# Patient Record
Sex: FEMALE | URBAN - METROPOLITAN AREA
[De-identification: names, ages, dates, MRNs, and addresses within clinical notes are randomized per-mention and may not be internally consistent; named-entity substitution may affect disease eponyms.]

---

## 2022-02-28 ENCOUNTER — HOSPITAL ENCOUNTER (OUTPATIENT)
Dept: NUTRITION | Age: 52
End: 2022-02-28

## 2022-03-01 ENCOUNTER — APPOINTMENT (OUTPATIENT)
Dept: NUTRITION | Age: 52
End: 2022-03-01

## 2023-01-16 ENCOUNTER — HOSPITAL ENCOUNTER (OUTPATIENT)
Dept: PHYSICAL THERAPY | Age: 53
Discharge: HOME | End: 2023-01-16
Payer: COMMERCIAL

## 2023-01-16 PROCEDURE — 97161 PT EVAL LOW COMPLEX 20 MIN: CPT

## 2023-01-16 NOTE — PROGRESS NOTES
[facility]  [address], [address] [facility] [place]  Ph [phone]  Fx [phone]    Plan of Care/ Statement of Necessity for Physical Therapy Services    Patient name: Yahir Stacy Start of Care: 2023   Referral source: [place] MD : 1970    Medical Diagnosis: Urge incontinence [N39.41]   Onset Date:2021    Treatment Diagnosis: Urge incontinence [N39.41]   Prior Hospitalization: see medical history Provider#: [id number]   Medications: Verified on Patient summary List    Comorbidities: pt reports having a history of LBP   Prior Level of Function: pt was incontinence during her pregnancies but was fine afterwards up until her onset date listed above          The Plan of Care and following information is based on the information from the initial evaluation. Assessment/ key information: Patient is a 47year old female presenting to Physical Therapy with c/o mixed urinary incontinence which is limiting ability function at previous level. Patient presents with decreased strength, coordination, and endurance of the pelvic floor muscles and decreased strength of postural stabilizers. Patient presents with fair understanding of bladder and bowel anatomy/function, dietary irritants, and urge suppression. I feel patient would benefit from skilled therapeutic intervention to optimize highest functional level possible. Patient will benefit from skilled PT services to modify and progress therapeutic interventions, address strength deficits, analyze and address soft tissue restrictions, analyze and cue movement patterns, analyze and modify body mechanics/ergonomics, assess and modify postural abnormalities, and instruct in home and community integration to attain remaining goals.     Evaluation Complexity History LOW Complexity : Zero comorbidities / personal factors that will impact the outcome / POC; Examination LOW Complexity : 1-2 Standardized tests and measures addressing body structure, function, activity limitation and / or participation in recreation  ;Presentation LOW Complexity : Stable, uncomplicated  ;Clinical Decision Making MEDIUM Complexity : FOTO score of 26-74 FOTO score = an established functional score where 100 = no disability  Overall Complexity Rating: LOW     Problem List: Decreased pelvic floor mm awareness, Decreased pelvic floor mm strength, Use of accessory muscles, Improper voiding habits, Urinary urgency, and Other  Treatment Plan may include any combination of the following: Therapeutic exercise, Urge suppression techniques, Neuromuscular re-education, Patient education, and Other  Patient / Family readiness to learn indicated by: asking questions, trying to perform skills, and interest  Persons(s) to be included in education: patient (P)  Barriers to Learning/Limitations: None  Patient Goal (s): pt wants to get strong and hold her pee and not rush to the bathroom, she doesn't want to leak when you cough and not a wear a pad  Patient Self Reported Health Status: excellent  Rehabilitation Potential: excellent    Short term goals: To be achieved in 6 sessions:  Patient will demonstrate proper dietary/fluid habits and urge suppression strategies that promote bladder and bowel health to aid in management of urinary urgency and incontinence. Status at eval: Patient consuming 64-80 oz of water and unaware of bladder fitness, dietary irritants and urge suppression strategies. Patient will report improvement in UI complaints evidence by a decrease in pad usage and/or amount of leakage by 25-50% to improve QOL. Status at eval: Patient using 3 pads (3) per day, generally soaked (amount of leakage). Patient will be able to maintain pelvic floor contraction for 10 seconds, 10 repetitions with no accessory substitution or breath holding. Status at eval: PFMC 7 seconds, 7 repetitions      Long term goals:  To be achieved in 12 sessions:  Patient will report bladder continence 50-75% of the time with cough/sneeze/laugh and walking to the toilet. Status at eval: patient stress and urgency incontinence 3 times  per day     Patient will demonstrate pelvic floor muscle contraction at least 4/5 and ability to maintain contraction for 10 seconds, 10 repetitions. Status at eval: PFM 3-/5, 7 second hold, 7 repetitions     Patient will demonstrate improvement of current complaints evidenced by a 14 point  improvement in FOTO, Pelvic functional disability index score  Status at Eval: Pelvic functional disability index 47     Patient will demonstrate independence with management tools and exercise program that are beneficial for current condition in order to feel comfortable with Pelvic floor PT D/C and not fear exacerbation of current condition or symptoms returning. Status at eval : patient fearful of return to exercise and unaware of what activities to avoid to avoid exacerbation of current condition       Frequency / Duration: Patient to be seen for 12 sessions    Patient/ Caregiver education and instruction: Diagnosis, prognosis, Proper Voiding Habits, Diet, Exercises, and Bladder Retraining   [x]  Plan of care has been reviewed with KIMBERLY Solis, PT 1/16/2023 4:05 PM    ________________________________________________________________________    I certify that the above Therapy Services are being furnished while the patient is under my care. I agree with the treatment plan and certify that this therapy is necessary.     Physician's Signature:____________________  Date:____________Time:____________                                      Manasa Bustamante MD      Please sign and return to [facility] [address] [place], [address] [facility]  Ph [phone]  Fx [phone]

## 2023-01-16 NOTE — PROGRESS NOTES
Physical Therapy Evaluation     Patient Name: Miguelina Toussaint  Date:2023  : 1970  [x]  Patient  Verified  Payor: [employer] / Plan: [place] [employer] / Product Type: HMO /    In time:11:30  Out time:12:30  Total Treatment Time (min): 60  Visit #: 1 of 12    Treatment Area: Urge incontinence [N39.41]    SUBJECTIVE    Current symptoms/Complaints: Pt is a 47year old female who presents to physical therapy with complaints of urinary leakage, that began about 2-3 years ago. Her periods have started changing recently began within the past year. Her incontinence started with her first child and worse with her second one. She started having hemmorhoids when she was pregnant as well. That improved over time. Currently, when she coughs, when she sits down onto the toilet she leaks. She feel like she has a strong urge and has a lot of urine but only a small amount comes out. When she coughs, she is soaked. When she really has to go, she feels like something is pushing and then the leaking becomes worse. 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   Exercise/Hobbies: elliptical and running/walking program, deadlift with weights when they were testing her with a  and she felt a lot of pushing down onto her bladder   Pt Goals: pt wants to get strong and hold her pee and not rush to the bathroom, she doesn't want to leak when you cough and not a wear a pad    Urinary Symptoms:     Current urinary complaint  leaks with activity (stress induced)  leaks with urgency   Coughing, laughing, walking to the bathroom, sitting on the toilet    Bladder complaint longevity:  1 - 3 years    Bladder symptom progression:  worsened    Frequency of UI: when you cough leaks/changed pad 3 times per times    Pad use:  incontinence pads: 3- 1 per day    Pad wetness when changed:  soaked  But when she really has to go will leak with little drops    Daytime urinary frequency:  Every 1 hour(s) during the day    Nocturia:  2x/ night when she wakes up she has to pee    Bowel Symptoms:     Bowel function:  Regular BM, 5-7 per week - sometimes has to strain, but not often; if she eats spicy foods it's worse  Stool Type [facility]): Type 1 - Separate Hard Lumps  Type 2 - Lumpy and Sausage Like  Type 3 - Sausage Shape with Cracks in the Surface  Type 4 - Like a Smooth Sausage or Snake  Type 5 - Soft Blobs with Clear Cut Edges  Type 6 - Mushy Consistency with Ragged Edges   Type 7 - Liquid Consistency with no Solid Pieces    Diet:    Fluid intake:  Fluid  Amount per day   Water 4 - 5 16 oz bottles of water   Caffeine [place] gray tea - 1 12 oz    Alcohol none   Other Occasional fresh squeezed juice     Weight: 167 lb - is currently doing intermittent fasting, lost 11 lbs so far    Physical Exam Objective Findings:  Pelvic Floor Assessment  Patient was educated on pelvic floor anatomy, structure, and function and implications for current presentation of s/s. Patient consents to pelvic floor assessment.      Observation:  Contraction - 3-/7/8/10  Bulge - good  Knack - mild reflex    PFM Screen:    Skin Integrity:  [x] Healthy [] Red  [] Labia Atrophy [] Fragile    Sensation: [x] Intact [] Diminished:    Muscle Bulk: [x] Symmetrical  [] Well-developed [] Atrophied:  []L   []R   []B    Prolapse: [x] Cystocele: Grade 1     External trigger point/ muscle tenderness: none    Pelvic floor manual exam: Performed via internal vaginal assessment    Normal     Pelvic floor MMT    PERF (Performance/Endurance/Repetitions//Flicks): 2-/1/9/81    Pelvic muscle release pattern  4 - complete release    40 min [x]Eval                  []Re-Eval       10 min Self Care: Review and handout provided on the following: PFM anatomy, structure and function as it pertains to current s/s, complaints and condition. Reviewed expectations for PFPT and POC. Bladder fitness education, urge suppression techniques, bladder irritants   Rationale:  Increase awareness and understanding of current condition to improve patients ability to independently and effectively attain goals and progress towards long term management of current condition. 10 min Neuromuscular Re-education: pelvic floor muscle contractions 7\"/7\" x 7 []  See flow sheet :   Rationale: increase strength, improve coordination, and increase proprioception  to improve the patients ability to activate pelvic floor    With   [] TE   [] TA   [] neuro   [] Self care: Patient Education: [] Initiate HEP  [] positioning   [] body mechanics   [] transfers      [] other:      Pain Level (0-10 scale) post treatment: 0    ASSESSMENT/Changes in Function: Patient is a 47year old female presenting to Physical Therapy with c/o mixed urinary incontinence which is limiting ability function at previous level. Patient presents with decreased strength, coordination, and endurance of the pelvic floor muscles and decreased strength of postural stabilizers. Patient presents with fair understanding of bladder and bowel anatomy/function, dietary irritants, and urge suppression. I feel patient would benefit from skilled therapeutic intervention to optimize highest functional level possible.      Patient will continue to benefit from skilled PT services to modify and progress therapeutic interventions, address strength deficits, analyze and address soft tissue restrictions, analyze and cue movement patterns, analyze and modify body mechanics/ergonomics, assess and modify postural abnormalities, and instruct in home and community integration to attain remaining goals. [x]  See Plan of Care  []  See progress note/recertification  []  See Discharge Summary         Progress towards goals / Updated goals:  Short term goals: To be achieved in 6 sessions:  Patient will demonstrate proper dietary/fluid habits and urge suppression strategies that promote bladder and bowel health to aid in management of urinary urgency and incontinence. Status at eval: Patient consuming 64-80 oz of water and unaware of bladder fitness, dietary irritants and urge suppression strategies. Patient will report improvement in UI complaints evidence by a decrease in pad usage and/or amount of leakage by 25-50% to improve QOL. Status at eval: Patient using 3 pads (3) per day, generally soaked (amount of leakage). Patient will be able to maintain pelvic floor contraction for 10 seconds, 10 repetitions with no accessory substitution or breath holding. Status at eval: PFMC 7 seconds, 7 repetitions     Long term goals: To be achieved in 12 sessions:  Patient will report bladder continence 50-75% of the time with cough/sneeze/laugh and walking to the toilet. Status at eval: patient stress and urgency incontinence 3 times  per day    Patient will demonstrate pelvic floor muscle contraction at least 4/5 and ability to maintain contraction for 10 seconds, 10 repetitions.    Status at eval: PFM 3-/5, 7 second hold, 7 repetitions    Patient will demonstrate improvement of current complaints evidenced by a 14 point  improvement in FOTO, Pelvic functional disability index score  Status at Eval: Pelvic functional disability index 47    Patient will demonstrate independence with management tools and exercise program that are beneficial for current condition in order to feel comfortable with Pelvic floor PT D/C and not fear exacerbation of current condition or symptoms returning.    Status at eval : patient fearful of return to exercise and unaware of what activities to avoid to avoid exacerbation of current condition    PLAN  []  Upgrade activities as tolerated     [x]  Continue plan of care  []  Update interventions per flow sheet       []  Discharge due to:_  []  Other:_      Radha Hopper, PT 1/16/2023  11:37 AM

## 2023-01-30 ENCOUNTER — HOSPITAL ENCOUNTER (OUTPATIENT)
Dept: PHYSICAL THERAPY | Age: 53
Discharge: HOME | End: 2023-01-30
Payer: COMMERCIAL

## 2023-01-30 PROCEDURE — 97112 NEUROMUSCULAR REEDUCATION: CPT

## 2023-01-30 PROCEDURE — 97110 THERAPEUTIC EXERCISES: CPT

## 2023-01-30 PROCEDURE — 97535 SELF CARE MNGMENT TRAINING: CPT

## 2023-01-30 NOTE — PROGRESS NOTES
PF DAILY TREATMENT NOTE    Patient Name: Hannah Jean  [facility]:  : 1970  [x]  Patient  Verified  Payor: Rafi Tucker / Plan: [employer] / Product Type: HMO /    In time:1130  Out time:1223  Total Treatment Time (min): 53    For MC/BCBS only  Total Timed Codes (min): 53  Of Timed Code minutes, 1:1 Treatment Time: 48     Visit #: 2 of 12    Treatment Area: Urge incontinence [N39.41]    SUBJECTIVE  Pain Level (0-10 scale): 0/10  Any medication changes, allergies to medications, adverse drug reactions, diagnosis change, or new procedure performed?: [x] No    [] Yes (see summary sheet for update)  Subjective functional status/changes:   [x] No changes reported    Patient reports doing the pelvic floor contractions but isn't certain that she is doing them correctly    OBJECTIVE      20 min Therapeutic Exercise:  [] See flow sheet :   [x]  Pelvic floor strengthening                 []  Pelvic floor downtraining  [x]  Quality pelvic floor contractions       []  Relaxation techniques  []  Urge suppression exercises  []  Other:  Rationale: increase strength and improve coordination  to improve the patients ability to maintain continence             25 min Neuromuscular Re-education:  []  See flow sheet :   []  Pelvic floor strengthening                 []  Pelvic floor downtraining  []  Quality pelvic floor contractions       []  Relaxation techniques  []  Urge suppression exercises  [x]  Other: Surface EMG  Rationale:Biofeedback was performed to determine the pelvic floor resting \"tone\", motor unit recruitment, endurance, and ability to relax from a work state to guide the treatments for the patient       8 min Self Care: HEP and KPFE installation and explanation   Rationale:    increase strength and improve coordination to improve the patients ability to maintain continence         With   [] TE   [] TA   [] neuro  [] manual  [] self care   [] other: Patient Education: [x] Review HEP    [] Progressed/Changed HEP based on:   [] positioning   [] body mechanics   [] transfers   [] heat/ice application    [] other:      Other Objective/Functional Measures:   Biofeedback expectations and implications were reviewed with patient prior to intervention,   Performed sEMG with perianal electrodes as follows:    Position, initial resting tone Work/Rest/Reps Comments   Sitting   5/10/10     Ave Resting at 5.9   uV    Min resting at 2.3    uV    Ave Work at   26.2     uV    [facility] Work at [facility] interval 20.2 uV   Sitting   2/4/10   Ave Resting at 10.8   uV    Min resting at   2.6  [employer] Work at   20.3     [employer] Work at [employer] interval 9.49uV    Standing  5/10/5 Ave Resting at 6.7   uV    Min resting at   3.9  [employer] Work at   23.7     [employer] Work at [facility] interval 17.07uV    Baseline prior to exercise in supine: Ave resting=    2.3 uV;  Min resting= 1.3  uV; Baseline prior to exercise in sitting : Ave resting=  3.7  uV;  Min resting= 3.0  uV;     Pain Level (0-10 scale) post treatment: 0/10    ASSESSMENT/Changes in Function: Patient tolerated today's session well with no c/o pain. Surface EMG study was performed. Patient demonstrated good motor unit recruitment, decreased endurance, good work to rest ratio but with decreased coordination of work to rest.  Patient demonstrated understanding of today's instruction, education, and recommendations. Patient is progressing appropriately towards goals. Patient will continue to benefit from skilled PT services to modify and progress therapeutic interventions, address functional mobility deficits, address ROM deficits, address strength deficits, analyze and address soft tissue restrictions, analyze and cue movement patterns, and analyze and modify body mechanics/ergonomics to attain remaining goals.      [x]  See Plan of Care  []  See progress note/recertification  []  See Discharge Summary         Progress towards goals / Updated goals:    Short term goals: To be achieved in 6 sessions:  Patient will demonstrate proper dietary/fluid habits and urge suppression strategies that promote bladder and bowel health to aid in management of urinary urgency and incontinence. Status at eval: Patient consuming 64-80 oz of water and unaware of bladder fitness, dietary irritants and urge suppression strategies. Patient will report improvement in UI complaints evidence by a decrease in pad usage and/or amount of leakage by 25-50% to improve QOL. Status at eval: Patient using 3 pads (3) per day, generally soaked (amount of leakage). Patient will be able to maintain pelvic floor contraction for 10 seconds, 10 repetitions with no accessory substitution or breath holding. Status at eval: PFMC 7 seconds, 7 repetitions      Long term goals: To be achieved in 12 sessions:  Patient will report bladder continence 50-75% of the time with cough/sneeze/laugh and walking to the toilet. Status at eval: patient stress and urgency incontinence 3 times  per day     Patient will demonstrate pelvic floor muscle contraction at least 4/5 and ability to maintain contraction for 10 seconds, 10 repetitions. Status at eval: PFM 3-/5, 7 second hold, 7 repetitions     Patient will demonstrate improvement of current complaints evidenced by a 14 point  improvement in FOTO, Pelvic functional disability index score  Status at Eval: Pelvic functional disability index 47     Patient will demonstrate independence with management tools and exercise program that are beneficial for current condition in order to feel comfortable with Pelvic floor PT D/C and not fear exacerbation of current condition or symptoms returning.    Status at eval : patient fearful of return to exercise and unaware of what activities to avoid to avoid exacerbation of current condition  Current:  HEP was issued  and [employer] leslie was installed 1/30/2023    PLAN  [x]  Upgrade activities as tolerated     [x]  Continue plan of care  []  Update interventions per flow sheet       []  Discharge due to:_  []  Other:_      Parvez Ladd, PT 1/30/2023  11:48 AM    Future Appointments   Date Time Provider [place]   2/6/2023 10:00 AM Shirley Francis, PT [facility]   2/13/2023 10:00 AM Shirley Francis, [facility]   2/20/2023 10:00 AM Shirley Francis, PT [facility]   2/27/2023 10:00 AM Shirley Francis, PT [facility]

## 2023-02-06 ENCOUNTER — HOSPITAL ENCOUNTER (OUTPATIENT)
Dept: PHYSICAL THERAPY | Age: 53
Discharge: HOME | End: 2023-02-06
Payer: COMMERCIAL

## 2023-02-06 PROCEDURE — 97530 THERAPEUTIC ACTIVITIES: CPT

## 2023-02-06 PROCEDURE — 97112 NEUROMUSCULAR REEDUCATION: CPT

## 2023-02-06 PROCEDURE — 97110 THERAPEUTIC EXERCISES: CPT

## 2023-02-06 NOTE — PROGRESS NOTES
PT DAILY TREATMENT NOTE    Patient Name: Bharat Sites  [id number]  : 1970  [x]  Patient  Verified  Payor: Josseline Angle / Plan: [place] [employer] / Product Type: HMO /    In time:10:00  Out time:10:53  Total Treatment Time (min): 53  Total Timed Codes (min): 48  Visit #: 3 of 12    Treatment Dx: Urge incontinence [N39.41]    SUBJECTIVE  Pain Level (0-10 scale): 0  Any medication changes, allergies to medications, adverse drug reactions, diagnosis change, or new procedure performed?: [x] No    [] Yes (see summary sheet for update)  Subjective functional status/changes:   [] No changes reported  Pt reports that she is having a difficult time delaying her urge to void. She has tried the urge suppression techniques. She still goes just in case. OBJECTIVE    10 min Therapeutic Exercise:  [x] See flow sheet :   Rationale: increase ROM to improve the patients ability to restore PLOF     10 min Therapeutic Activity: pt educated again on bladder fitness []  See flow sheet :   Rationale: to decrease urinary urgency/frequency     33 min Neuromuscular Re-education:  [x]  See flow sheet :   Rationale: increase strength, improve coordination, improve balance, and increase proprioception  to improve the patients ability to activate pelvic floor and core          With   [] TE   [x] TA   [x] neuro   [] other: Patient Education: [x] Review HEP    [] Progressed/Changed HEP based on:   [] positioning   [] body mechanics   [] transfers   [] heat/ice application    [] other:      Other Objective/Functional Measures: more pelvic movement/less core stability with left lower extremity flexion as seen during running man     Pain Level (0-10 scale) post treatment: 0    ASSESSMENT/Changes in Function: Patient tolerated treatment session well today. Patient had no complaints with addition of dying bug and hip stretches to exercise program to accomplish more core support and improved hip flexibility for decreased urgency.   Patient continues to make good progress toward goals and would benefit from continued skilled PT intervention to address remaining deficits outlined in goals below. Patient will continue to benefit from skilled PT services to modify and progress therapeutic interventions, address functional mobility deficits, address ROM deficits, address strength deficits, analyze and cue movement patterns, analyze and modify body mechanics/ergonomics, assess and modify postural abnormalities, and instruct in home and community integration to attain remaining goals. [x]  See Plan of Care  []  See progress note/recertification  []  See Discharge Summary         Progress towards goals / Updated goals:  Short term goals: To be achieved in 6 sessions:  Patient will demonstrate proper dietary/fluid habits and urge suppression strategies that promote bladder and bowel health to aid in management of urinary urgency and incontinence. Status at eval: Patient consuming 64-80 oz of water and unaware of bladder fitness, dietary irritants and urge suppression strategies. Current: pt has tried using urge suppression techniques but reports it's very difficult 2/6/2023 progressing     Patient will report improvement in UI complaints evidence by a decrease in pad usage and/or amount of leakage by 25-50% to improve QOL. Status at eval: Patient using 3 pads (3) per day, generally soaked (amount of leakage). Patient will be able to maintain pelvic floor contraction for 10 seconds, 10 repetitions with no accessory substitution or breath holding. Status at eval: PFMC 7 seconds, 7 repetitions      Long term goals: To be achieved in 12 sessions:  Patient will report bladder continence 50-75% of the time with cough/sneeze/laugh and walking to the toilet.    Status at eval: patient stress and urgency incontinence 3 times  per day     Patient will demonstrate pelvic floor muscle contraction at least 4/5 and ability to maintain contraction for 10 seconds, 10 repetitions. Status at eval: PFM 3-/5, 7 second hold, 7 repetitions     Patient will demonstrate improvement of current complaints evidenced by a 14 point  improvement in FOTO, Pelvic functional disability index score  Status at Eval: Pelvic functional disability index 47     Patient will demonstrate independence with management tools and exercise program that are beneficial for current condition in order to feel comfortable with Pelvic floor PT D/C and not fear exacerbation of current condition or symptoms returning.    Status at eval : patient fearful of return to exercise and unaware of what activities to avoid to avoid exacerbation of current condition  Current:  HEP was issued  and [employer] leslie was installed 1/30/2023    PLAN  []  Upgrade activities as tolerated     [x]  Continue plan of care  []  Update interventions per flow sheet       []  Discharge due to:_  []  Other:_      Rancho Pruett PT 2/6/2023  9:54 AM    Future Appointments   Date Time Provider Day [place]   2/6/2023 10:00 AM Gabino Snellen, PT [facility]   2/13/2023 10:00 AM Gabino Snellen, [address] [facility]   2/20/2023 10:00 AM Gabino Snellen, [address] [facility]   2/27/2023 10:00 AM Gabino Snellen, PT [facility]

## 2023-02-13 ENCOUNTER — HOSPITAL ENCOUNTER (OUTPATIENT)
Facility: HOSPITAL | Age: 53
Setting detail: RECURRING SERIES
Discharge: HOME | End: 2023-02-16
Payer: COMMERCIAL

## 2023-02-13 ENCOUNTER — APPOINTMENT (OUTPATIENT)
Facility: HOSPITAL | Age: 53
End: 2023-02-13
Payer: COMMERCIAL

## 2023-02-13 ENCOUNTER — TELEPHONE (OUTPATIENT)
Facility: HOSPITAL | Age: 53
End: 2023-02-13

## 2023-02-13 ENCOUNTER — APPOINTMENT (OUTPATIENT)
Dept: PHYSICAL THERAPY | Age: 53
End: 2023-02-13
Payer: COMMERCIAL

## 2023-02-13 PROCEDURE — 97112 NEUROMUSCULAR REEDUCATION: CPT

## 2023-02-13 PROCEDURE — 97530 THERAPEUTIC ACTIVITIES: CPT

## 2023-02-13 NOTE — PROGRESS NOTES
PHYSICAL / OCCUPATIONAL THERAPY - DAILY TREATMENT NOTE (updated )    Patient Name: Janki Merida    Date: 2023    : 1970  Insurance: Payor: [employer] / Plan: OPTIMA HMO / Product Type: *No Product type* /      Patient  verified yes     Visit #   Current / Total 4 12   Time   In / Out 10:05 11:03   Pain   In / Out 0 0   Subjective Functional Status/Changes: Pt reports she's been trying to hold back her urge but it's difficult. She also has had a difficult time remembering to fill out her bladder diary. She will keep working on it this week. Changes to:  Meds, Allergies, Med Hx, Sx Hx? If yes, update Summary List no       TREATMENT AREA =  Urge incontinence [N39.41]    OBJECTIVE  Therapeutic Procedures: Tx Min Billable or 1:1 Min (if diff from Tx Min) Procedure, Rationale, Specifics   [phone] [id number] Therapeutic Activity (timed):  use of dynamic activities replicating functional movements to increase ROM, strength, coordination, balance, and proprioception in order to improve patient's ability to progress to PLOF and address remaining functional goals. (see flow sheet as applicable)     Details if applicable:  testing for note of progress     33 [phone] [id number] Neuromuscular Re-Education (timed):  improve balance, coordination, kinesthetic sense, posture, core stability and proprioception to improve patient's ability to develop conscious control of individual muscles and awareness of position of extremities in order to progress to PLOF and address remaining functional goals.  (see flow sheet as applicable)     Details if applicable:            Details if applicable:            Details if applicable:            Details if applicable:     62 [phone] Totals Reminder: bill using total billable min of TIMED therapeutic procedures (example: do not include dry needle or estim unattended, both untimed codes, in totals to left)  8-22 min = 1 unit; 23-37 min = 2 units; 38-52 min = 3 units; 53-67 min = 4 units; 68-82 min = 5 units   Total Total     [x]  Patient Education billed concurrently with other procedures   [x] Review HEP    [] Progressed/Changed HEP, detail:    [] Other detail:       Objective Information/Functional Measures/Assessment    sEMG Biofeedback Results (in uV):    Supine:  10s on/10s off   work average:  15.4 rest average: 5  Seated:  2s on/4s off  work average:  19.9 rest average: 2.7  10s on/10s off  work average:  19.8 rest average: 4.4    Patient will continue to benefit from skilled PT / OT services to modify and progress therapeutic interventions, analyze and address strength deficits, analyze and cue for proper movement patterns, analyze and modify for postural abnormalities, and instruct in home and community integration to address functional deficits and attain remaining goals. Progress toward goals / Updated goals:  [x]  See Progress Note/Recertification    Short term goals: To be achieved in 6 sessions:  Patient will demonstrate proper dietary/fluid habits and urge suppression strategies that promote bladder and bowel health to aid in management of urinary urgency and incontinence. Status at eval: Patient consuming 64-80 oz of water and unaware of bladder fitness, dietary irritants and urge suppression strategies. Current: pt has tried using urge suppression techniques but reports it's very difficult 2/6/2023 progressing     Patient will report improvement in UI complaints evidence by a decrease in pad usage and/or amount of leakage by 25-50% to improve QOL. Status at eval: Patient using 3 pads (3) per day, generally soaked (amount of leakage). Current: pt is using 2 pads per day, sometimes they are soaked, last week only 1 time was soaked 2/13/2023 progressing     Patient will be able to maintain pelvic floor contraction for 10 seconds, 10 repetitions with no accessory substitution or breath holding.   Status at eval: PFMC 7 seconds, 7 repetitions   Current: pt deferred internal assessment but biofeedback revealed that patient was able to sustain a strong contraction for 10 seconds, 10 times 2/13/2023 goal met     Long term goals: To be achieved in 12 sessions:  Patient will report bladder continence 50-75% of the time with cough/sneeze/laugh and walking to the toilet. Status at eval: patient stress and urgency incontinence 3 times  per day  Current: notices leaking less 2/13/2023 progressing     Patient will demonstrate pelvic floor muscle contraction at least 4/5 and ability to maintain contraction for 10 seconds, 10 repetitions. Status at eval: PFM 3-/5, 7 second hold, 7 repetitions  Current: pt deferred internal assessment but biofeedback revealed that patient was able to sustain a contraction at a net strength of 15 uV for 10 seconds, 10 times 2/13/2023 progressing     Patient will demonstrate improvement of current complaints evidenced by a 14 point  improvement in FOTO, Pelvic functional disability index score  Status at Eval: Pelvic functional disability index 47  Current: 49 2/13/2023 progressing     Patient will demonstrate independence with management tools and exercise program that are beneficial for current condition in order to feel comfortable with Pelvic floor PT D/C and not fear exacerbation of current condition or symptoms returning.    Status at eval : patient fearful of return to exercise and unaware of what activities to avoid to avoid exacerbation of current condition  Current:  HEP was issued  and [employer] aliza was installed  2/13/2023 progressing    PLAN  yes Continue plan of care  [x]  Upgrade activities as tolerated  []  Discharge due to :  []  Other:    Bakari Aguilar PT    2/13/2023    10:05 AM    Future Appointments   Date Time Provider [place]   2/20/2023 10:00 AM Bakari Aguilar PT [facility]   2/27/2023 10:00 AM Bakari Aguilar PT [facility]

## 2023-02-13 NOTE — PROGRESS NOTES
[facility]  [address] [place], [address]  Ph [phone]  Fx [phone]    Physical Therapy Progress Note  Patient name: Genia Schilder Start of Care: 2023   Referral source: Jerry Du MD : 1970   Medical/Treatment Diagnosis: Urge incontinence [N39.41] Onset Date:2021   Prior Hospitalization: see medical history Provider#: [id number]   Medications: Verified on Patient Summary List    Comorbidities: none  Prior Level of Function:patient was not incontinent    Visits from Start of Care: 4   Missed Visits: 1    Updated Goals/Measure of Progress:     Short term goals: To be achieved in 2 sessions:  Patient will demonstrate proper dietary/fluid habits and urge suppression strategies that promote bladder and bowel health to aid in management of urinary urgency and incontinence. Status at eval: Patient consuming 64-80 oz of water and unaware of bladder fitness, dietary irritants and urge suppression strategies. Current: pt has tried using urge suppression techniques but reports it's very difficult 2023 progressing     Patient will report improvement in UI complaints evidence by a decrease in pad usage and/or amount of leakage by 25-50% to improve QOL. Status at eval: Patient using 3 pads (3) per day, generally soaked (amount of leakage). Current: pt is using 2 pads per day, sometimes they are soaked, last week only 1 time was soaked 2023 progressing     Patient will be able to maintain pelvic floor contraction for 10 seconds, 10 repetitions with no accessory substitution or breath holding. Status at eval: PFMC 7 seconds, 7 repetitions   Current: pt deferred internal assessment but biofeedback revealed that patient was able to sustain a strong contraction for 10 seconds, 10 times 2023 goal met     Long term goals:  To be achieved in 8 sessions:  Patient will report bladder continence 50-75% of the time with cough/sneeze/laugh and walking to the toilet. Status at eval: patient stress and urgency incontinence 3 times  per day  Current: notices leaking less 2/13/2023 progressing     Patient will demonstrate pelvic floor muscle contraction at least 4/5 and ability to maintain contraction for 10 seconds, 10 repetitions. Status at eval: PFM 3-/5, 7 second hold, 7 repetitions  Current: pt deferred internal assessment but biofeedback revealed that patient was able to sustain a contraction at a net strength of 15 uV for 10 seconds, 10 times 2/13/2023 progressing     Patient will demonstrate improvement of current complaints evidenced by a 14 point  improvement in FOTO, Pelvic functional disability index score  Status at Eval: Pelvic functional disability index 47  Current: 49 2/13/2023 progressing     Patient will demonstrate independence with management tools and exercise program that are beneficial for current condition in order to feel comfortable with Pelvic floor PT D/C and not fear exacerbation of current condition or symptoms returning. Status at eval : patient fearful of return to exercise and unaware of what activities to avoid to avoid exacerbation of current condition  Current:  HEP was issued  and [employer] aliza was installed  2/13/2023 progressing    Summary of Care/ Key Functional Changes: pt shows improved pelvic floor strength, but needs to work on coordination as she has a higher than normal resting tone. She also needs to work on her core strength in order to provide a good lift of the bladder. She has been incorporating good urge suppression strategies as well as trying to follow proper bladder fitness.       ASSESSMENT/RECOMMENDATIONS:  [x]Continue therapy per initial plan/protocol at a frequency of  8 sessions  []Continue therapy with the following recommended changes:_____________________ _____________________________ ________________________________________  []Discontinue therapy progressing towards or have reached established goals  []Discontinue therapy due to lack of appreciable progress towards goals  []Discontinue therapy due to lack of attendance or compliance  []Await Physician's recommendations/decisions regarding therapy  []Other:________________________________________________________________    Thank you for this referral.   Abdullahi Arnett, PT 2/13/2023 11:52 AM

## 2023-02-20 ENCOUNTER — APPOINTMENT (OUTPATIENT)
Facility: HOSPITAL | Age: 53
End: 2023-02-20
Payer: COMMERCIAL

## 2023-02-20 ENCOUNTER — APPOINTMENT (OUTPATIENT)
Dept: PHYSICAL THERAPY | Age: 53
End: 2023-02-20
Payer: COMMERCIAL

## 2023-02-27 ENCOUNTER — APPOINTMENT (OUTPATIENT)
Dept: PHYSICAL THERAPY | Age: 53
End: 2023-02-27
Payer: COMMERCIAL

## 2023-02-27 ENCOUNTER — HOSPITAL ENCOUNTER (OUTPATIENT)
Facility: HOSPITAL | Age: 53
Setting detail: RECURRING SERIES
Discharge: HOME | End: 2023-03-02
Payer: COMMERCIAL

## 2023-02-27 PROCEDURE — 97112 NEUROMUSCULAR REEDUCATION: CPT

## 2023-02-27 PROCEDURE — 97530 THERAPEUTIC ACTIVITIES: CPT

## 2023-02-27 PROCEDURE — 97110 THERAPEUTIC EXERCISES: CPT

## 2023-02-27 NOTE — PROGRESS NOTES
PHYSICAL / OCCUPATIONAL THERAPY - DAILY TREATMENT NOTE (updated )    Patient Name: Chen Alonzo    Date: 2023    : 1970  Insurance: Payor: [employer] Smith / Plan: OPTIMA HMO / Product Type: *No Product type* /      Patient  verified Yes     Visit #   Current / Total 5 12   Time   In / Out 10:00 10:55   Pain   In / Out 0 0   Subjective Functional Status/Changes: Pt tries to drink 4-5 16 oz bottles of water/day. She reports sometimes waiting and sometimes it doesn't work. She leaked a little bit this week while bending and coughing while standing. Changes to:  Meds, Allergies, Med Hx, Sx Hx? If yes, update Summary List no       TREATMENT AREA =  Urge incontinence [N39.41]    OBJECTIVE         Therapeutic Procedures: Tx Min Billable or 1:1 Min (if diff from Tx Min) Procedure, Rationale, Specifics   10 10 [id number] Therapeutic Exercise (timed):  increase ROM, strength, coordination, balance, and proprioception to improve patient's ability to progress to PLOF and address remaining functional goals. (see flow sheet as applicable)     Details if applicable:        [id number] Neuromuscular Re-Education (timed):  improve balance, coordination, kinesthetic sense, posture, core stability and proprioception to improve patient's ability to develop conscious control of individual muscles and awareness of position of extremities in order to progress to PLOF and address remaining functional goals. (see flow sheet as applicable)     Details if applicable:      79809 Therapeutic Activity (timed):  use of dynamic activities replicating functional movements to increase ROM, strength, coordination, balance, and proprioception in order to improve patient's ability to progress to PLOF and address remaining functional goals.   (see flow sheet as applicable)     Details if applicable:  pt educated on bending/lifting and sit to stand mechanics; pt reminded of \"the knack\" to squeeze before she sneezes          Details if applicable:            Details if applicable:     52 [address] [facility] Totals Reminder: bill using total billable min of TIMED therapeutic procedures (example: do not include dry needle or estim unattended, both untimed codes, in totals to left)  8-22 min = 1 unit; 23-37 min = 2 units; 38-52 min = 3 units; 53-67 min = 4 units; 68-82 min = 5 units   Total Total     [x]  Patient Education billed concurrently with other procedures   [x] Review HEP    [] Progressed/Changed HEP, detail:    [] Other detail:       Objective Information/Functional Measures/Assessment    Pt tolerated treatment well today. She was educated on proper bending/lifting and sit to stand mechanics as she has leaked in these positions. She has also had back pain after a day of bending/lifting. She was also challenged with standing core strengthening. Patient will continue to benefit from skilled PT / OT services to modify and progress therapeutic interventions, analyze and address functional mobility deficits, analyze and address ROM deficits, analyze and address strength deficits, analyze and address soft tissue restrictions, analyze and cue for proper movement patterns, analyze and modify for postural abnormalities, analyze and address imbalance/dizziness, and instruct in home and community integration to address functional deficits and attain remaining goals. Progress toward goals / Updated goals:  []  See Progress Note/Recertification    Short term goals: To be achieved in 6 sessions:  Patient will demonstrate proper dietary/fluid habits and urge suppression strategies that promote bladder and bowel health to aid in management of urinary urgency and incontinence. Status at eval: Patient consuming 64-80 oz of water and unaware of bladder fitness, dietary irritants and urge suppression strategies.    Current: pt has tried using urge suppression techniques but reports it's very difficult 2/6/2023 progressing     Patient will report improvement in UI complaints evidence by a decrease in pad usage and/or amount of leakage by 25-50% to improve QOL. Status at eval: Patient using 3 pads (3) per day, generally soaked (amount of leakage). Current: pt is using 2 pads per day, sometimes they are soaked, last week only 1 time was soaked 2/13/2023 progressing     Patient will be able to maintain pelvic floor contraction for 10 seconds, 10 repetitions with no accessory substitution or breath holding. Status at eval: PFMC 7 seconds, 7 repetitions   Current: pt deferred internal assessment but biofeedback revealed that patient was able to sustain a strong contraction for 10 seconds, 10 times 2/13/2023 goal met     Long term goals: To be achieved in 12 sessions:  Patient will report bladder continence 50-75% of the time with cough/sneeze/laugh and walking to the toilet. Status at eval: patient stress and urgency incontinence 3 times  per day  Current: notices leaking less 2/13/2023 progressing     Patient will demonstrate pelvic floor muscle contraction at least 4/5 and ability to maintain contraction for 10 seconds, 10 repetitions. Status at eval: PFM 3-/5, 7 second hold, 7 repetitions  Current: pt deferred internal assessment but biofeedback revealed that patient was able to sustain a contraction at a net strength of 15 uV for 10 seconds, 10 times 2/13/2023 progressing     Patient will demonstrate improvement of current complaints evidenced by a 14 point  improvement in FOTO, Pelvic functional disability index score  Status at Eval: Pelvic functional disability index 47  Current: 49 2/13/2023 progressing     Patient will demonstrate independence with management tools and exercise program that are beneficial for current condition in order to feel comfortable with Pelvic floor PT D/C and not fear exacerbation of current condition or symptoms returning.    Status at eval : patient fearful of return to exercise and unaware of what activities to avoid to avoid exacerbation of current condition  Current:  HEP was issued  and [employer] aliza was installed  2/13/2023 progressing       PLAN  Yes  Continue plan of care  []  Upgrade activities as tolerated  []  Discharge due to :  []  Other:    Miguel Ángel Estrella, PT    2/27/2023    10:00 AM    No future appointments.

## 2023-03-06 ENCOUNTER — HOSPITAL ENCOUNTER (OUTPATIENT)
Facility: HOSPITAL | Age: 53
Setting detail: RECURRING SERIES
End: 2023-03-06
Payer: COMMERCIAL

## 2023-03-06 ENCOUNTER — TELEPHONE (OUTPATIENT)
Facility: HOSPITAL | Age: 53
End: 2023-03-06

## 2023-03-06 NOTE — PROGRESS NOTES
PHYSICAL / OCCUPATIONAL THERAPY - DAILY TREATMENT NOTE (updated )    Patient Name: Audrey Laboy    Date: 3/6/2023    : 1970  Insurance: Payor: Tyler Ruggiero / Plan: OPTIMA HMO / Product Type: *No Product type* /      Patient  verified {YES/NO:80327}     Visit #   Current / Total *** ***   Time   In / Out *** ***   Pain   In / Out *** ***   Subjective Functional Status/Changes: ***   Changes to:  Meds, Allergies, Med Hx, Sx Hx? If yes, update Summary List {YES/NO DIET:[id number]}       TREATMENT AREA =  Urge incontinence [N39.41]    OBJECTIVE    Modalities Rationale:     {[facility] Modality Rationale:66882} to improve patient's ability to progress to PLOF and address remaining functional goals. min [] Estim Unattended, type/location:                                      []  w/ice    []  w/heat    min [] Estim Attended, type/location:                                     []  w/US     []  w/ice    []  w/heat    []  TENS insruct      min []  Mechanical Traction: type/lbs                   []  pro   []  sup   []  int   []  cont    []  before manual    []  after manual    min []  Ultrasound, settings/location:      min []  Iontophoresis w/ dexamethasone, location:                                               []  take home patch       []  in clinic        min  unbilled []  Ice     []  Heat    location/position:     min []  Paraffin,  details:     min []  Vasopneumatic Device, press/temp:     min []  Claudy Marcos / Iota Fuelling: If using vaso (only need to measure limb vaso being performed on)      pre-treatment girth :       post-treatment girth :       measured at (landmark location) :      min []  Other:    Skin assessment post-treatment (if applicable):    []  intact    []  redness- no adverse reaction                 []redness - adverse reaction:         Therapeutic Procedures:   Tx Min Billable or 1:1 Min (if diff from Tx Min) Procedure, Rationale, Specifics   ***  {[facility] Ther Procedures:47457}     Details if

## 2023-03-13 ENCOUNTER — HOSPITAL ENCOUNTER (OUTPATIENT)
Facility: HOSPITAL | Age: 53
Setting detail: RECURRING SERIES
Discharge: HOME | End: 2023-03-16
Payer: COMMERCIAL

## 2023-03-13 PROCEDURE — 97112 NEUROMUSCULAR REEDUCATION: CPT

## 2023-03-13 PROCEDURE — 97110 THERAPEUTIC EXERCISES: CPT

## 2023-03-13 PROCEDURE — 97530 THERAPEUTIC ACTIVITIES: CPT

## 2023-03-13 NOTE — PROGRESS NOTES
PHYSICAL / OCCUPATIONAL THERAPY - DAILY TREATMENT NOTE (updated )    Patient Name: Skyler Bolanos    Date: 3/13/2023    : 1970  Insurance: Payor: Jessica Marin / Plan: OPTIMA HMO / Product Type: *No Product type* /      Patient  verified Yes     Visit #   Current / Total 6 12   Time   In / Out 12:00 12:53   Pain   In / Out 0 0   Subjective Functional Status/Changes: Pt reports she is doing her pelvic floor exercises 2 sometimes 3 times per day    Changes to:  Meds, Allergies, Med Hx, Sx Hx? If yes, update Summary List no       TREATMENT AREA =  Urge incontinence [N39.41]    OBJECTIVE    Therapeutic Procedures: Tx Min Billable or 1:1 Min (if diff from Tx Min) Procedure, Rationale, Specifics   10  13818 Therapeutic Exercise (timed):  increase ROM, strength, coordination, balance, and proprioception to improve patient's ability to progress to PLOF and address remaining functional goals. (see flow sheet as applicable)     Details if applicable:       15  98853 Therapeutic Activity (timed):  use of dynamic activities replicating functional movements to increase ROM, strength, coordination, balance, and proprioception in order to improve patient's ability to progress to PLOF and address remaining functional goals. (see flow sheet as applicable)     Details if applicable:  sit to stand, step ups   28  10498 Neuromuscular Re-Education (timed):  improve balance, coordination, kinesthetic sense, posture, core stability and proprioception to improve patient's ability to develop conscious control of individual muscles and awareness of position of extremities in order to progress to PLOF and address remaining functional goals.  (see flow sheet as applicable)     Details if applicable:            Details if applicable:            Details if applicable:     48  [address] [employer] Totals Reminder: bill using total billable min of TIMED therapeutic procedures (example: do not include dry needle or estim unattended, both untimed codes, in totals to left)  8-22 min = 1 unit; 23-37 min = 2 units; 38-52 min = 3 units; 53-67 min = 4 units; 68-82 min = 5 units   Total Total     TOTAL TREATMENT TIME:        53     [x]  Patient Education billed concurrently with other procedures   [x] Review HEP    [] Progressed/Changed HEP, detail:    [] Other detail:       Objective Information/Functional Measures/Assessment    Pt tolerated treatment well today, progressed to standing pelvic floor strengthening.     Patient will continue to benefit from skilled PT / OT services to modify and progress therapeutic interventions, analyze and address functional mobility deficits, analyze and address ROM deficits, analyze and address strength deficits, analyze and address soft tissue restrictions, analyze and cue for proper movement patterns, analyze and modify for postural abnormalities, analyze and address imbalance/dizziness, and instruct in home and community integration to address functional deficits and attain remaining goals.    Progress toward goals / Updated goals:  []  See Progress Note/Recertification    Short term goals: To be achieved in 6 sessions:  Patient will demonstrate proper dietary/fluid habits and urge suppression strategies that promote bladder and bowel health to aid in management of urinary urgency and incontinence.  Status at eval: Patient consuming 64-80 oz of water and unaware of bladder fitness, dietary irritants and urge suppression strategies.   Current: pt has tried using urge suppression techniques but reports it's very difficult 2/6/2023 progressing     Patient will report improvement in UI complaints evidence by a decrease in pad usage and/or amount of leakage by 25-50% to improve QOL.  Status at eval: Patient using 3 pads (3) per day, generally soaked (amount of leakage).   Current: pt is using 2 pads per day, sometimes they are soaked, last week only 1 time was soaked 2/13/2023 progressing     Patient will be able to maintain pelvic floor  contraction for 10 seconds, 10 repetitions with no accessory substitution or breath holding. Status at eval: PFMC 7 seconds, 7 repetitions   Current: pt deferred internal assessment but biofeedback revealed that patient was able to sustain a strong contraction for 10 seconds, 10 times 2/13/2023 goal met     Long term goals: To be achieved in 12 sessions:  Patient will report bladder continence 50-75% of the time with cough/sneeze/laugh and walking to the toilet. Status at eval: patient stress and urgency incontinence 3 times  per day  Current: notices leaking less 2/13/2023 progressing     Patient will demonstrate pelvic floor muscle contraction at least 4/5 and ability to maintain contraction for 10 seconds, 10 repetitions. Status at eval: PFM 3-/5, 7 second hold, 7 repetitions  Current: pt deferred internal assessment but biofeedback revealed that patient was able to sustain a contraction at a net strength of 15 uV for 10 seconds, 10 times 2/13/2023 progressing     Patient will demonstrate improvement of current complaints evidenced by a 14 point  improvement in FOTO, Pelvic functional disability index score  Status at Eval: Pelvic functional disability index 47  Current: 49 2/13/2023 progressing     Patient will demonstrate independence with management tools and exercise program that are beneficial for current condition in order to feel comfortable with Pelvic floor PT D/C and not fear exacerbation of current condition or symptoms returning.    Status at eval : patient fearful of return to exercise and unaware of what activities to avoid to avoid exacerbation of current condition  Current:  HEP was issued  and Medius aliza was installed  2/13/2023 progressing    PLAN  Yes  Continue plan of care  []  Upgrade activities as tolerated  []  Discharge due to :  []  Other:    Manasa Najera, PT    3/13/2023    8:00 AM    Future Appointments   Date Time Provider Luana Lee   3/13/2023 12:00 PM Epi Cardona Tommy Grace [facility]   3/20/2023 12:00 PM Emil Beckett, PT [facility]   3/27/2023 10:30 AM Ingris Aguirre, PT [facility]

## 2023-03-20 ENCOUNTER — HOSPITAL ENCOUNTER (OUTPATIENT)
Facility: HOSPITAL | Age: 53
Setting detail: RECURRING SERIES
Discharge: HOME | End: 2023-03-23
Payer: COMMERCIAL

## 2023-03-20 PROCEDURE — 97530 THERAPEUTIC ACTIVITIES: CPT

## 2023-03-20 PROCEDURE — 97112 NEUROMUSCULAR REEDUCATION: CPT

## 2023-03-20 PROCEDURE — 97110 THERAPEUTIC EXERCISES: CPT

## 2023-03-20 NOTE — PROGRESS NOTES
[facility]  [address] [place], [address]  Ph [phone]  Fx [phone]    Physical Therapy Progress Note  Patient name: Rae Davidson Start of Care: 2023   Referral source: Oleg Bueno MD : 1970   Medical/Treatment Diagnosis: Urge incontinence [N39.41] Onset Date:2021   Prior Hospitalization: see medical history Provider#: [id number]   Medications: Verified on Patient Summary List    Comorbidities: none  Prior Level of Function:patient was not incontinent    Visits from Start of Care: 7    Missed Visits: 2    Updated Goals/Measure of Progress:     Short term goals:   Patient will demonstrate proper dietary/fluid habits and urge suppression strategies that promote bladder and bowel health to aid in management of urinary urgency and incontinence. Status at eval: Patient consuming 64-80 oz of water and unaware of bladder fitness, dietary irritants and urge suppression strategies. 2023: pt has tried using urge suppression techniques but reports it's very difficult   Current: pt has been trying to delay the urge to go to the bathroom. She tries to only go when she really has to, goes every hour, sometimes 2 hours 3/20/2023 progressing     Patient will report improvement in UI complaints evidence by a decrease in pad usage and/or amount of leakage by 25-50% to improve QOL. Status at eval: Patient using 3 pads (3) per day, generally soaked (amount of leakage). Last PN: pt is using 2 pads per day, sometimes they are soaked, last week only 1 time was soaked 2023 progressing  Current: pt sometimes uses 3 pads per day, has been experiencing seasonal allergies and has been coughing more frequently 3/20/2023  regression secondary to allergies     Patient will be able to maintain pelvic floor contraction for 10 seconds, 10 repetitions with no accessory substitution or breath holding.   Status at eval: PFMC 7 seconds, 7 repetitions   Current: pt deferred

## 2023-03-20 NOTE — PROGRESS NOTES
PHYSICAL / OCCUPATIONAL THERAPY - DAILY TREATMENT NOTE (updated )    Patient Name: Elijah Lawson    Date: 3/20/2023    : 1970  Insurance: Payor: Km Corrales / Plan: OPTIMA HMO / Product Type: *No Product type* /      Patient  verified Yes     Visit #   Current / Total 7 12   Time   In / Out 12 12:54   Pain   In / Out 0 0   Subjective Functional Status/Changes: Pt reports sometimes she's good with the exercises, sometimes not. Sometimes her bladder habits are good, sometimes not   Changes to:  Meds, Allergies, Med Hx, Sx Hx? If yes, update Summary List no       TREATMENT AREA =  Urge incontinence [N39.41]    OBJECTIVE    Therapeutic Procedures: Tx Min Billable or 1:1 Min (if diff from Tx Min) Procedure, Rationale, Specifics   25  23300 Therapeutic Activity (timed):  use of dynamic activities replicating functional movements to increase ROM, strength, coordination, balance, and proprioception in order to improve patient's ability to progress to PLOF and address remaining functional goals. (see flow sheet as applicable)     Details if applicable:  testing for note of progress and step ups with form education for functional strengthening     10  39533 Therapeutic Exercise (timed):  increase ROM, strength, coordination, balance, and proprioception to improve patient's ability to progress to PLOF and address remaining functional goals. (see flow sheet as applicable)     Details if applicable:      Neuromuscular Re-Education (timed):  improve balance, coordination, kinesthetic sense, posture, core stability and proprioception to improve patient's ability to develop conscious control of individual muscles and awareness of position of extremities in order to progress to PLOF and address remaining functional goals.  (see flow sheet as applicable)     Details if applicable:            Details if applicable:            Details if applicable:     [address]  [address] Reminder: bill using total billable min of TIMED

## 2023-03-27 ENCOUNTER — HOSPITAL ENCOUNTER (OUTPATIENT)
Facility: HOSPITAL | Age: 53
Setting detail: RECURRING SERIES
Discharge: HOME | End: 2023-03-30
Payer: COMMERCIAL

## 2023-03-27 PROCEDURE — 97112 NEUROMUSCULAR REEDUCATION: CPT

## 2023-03-27 PROCEDURE — 97530 THERAPEUTIC ACTIVITIES: CPT

## 2023-03-27 PROCEDURE — 97110 THERAPEUTIC EXERCISES: CPT

## 2023-03-27 NOTE — PROGRESS NOTES
= 1 unit; 23-37 min = 2 units; 38-52 min = 3 units; 53-67 min = 4 units; 68-82 min = 5 units   Total Total     TOTAL TREATMENT TIME:        53     [x]  Patient Education billed concurrently with other procedures   [x] Review HEP    [] Progressed/Changed HEP, detail:    [] Other detail:       Objective Information/Functional Measures/Assessment  Biofeedback expectations and implications were reviewed with patient prior to intervention,   Performed sEMG with perianal electrodes as follows:    Position, initial resting tone Work/Rest/Reps Comments   Sitting   10/10/10     Ave Resting at 4.4   uV    Min resting at  1.1   uV    Ave Work at   22.8     [employer] Work at 44.3 uV    W-R interval 18.41 uV   Sitting   2/4/10   Ave Resting at 8.5   uV    Min resting at  1.4   [place] Work at   18.5     uV    Max Work at 41.5 uV    W-R interval 9.99 uV      Patient tolerated today's session well with no c/o pain. Surface EMG was repeated using 10 sec hold/10 sec rest for long and 2 second hold 4 sec rest for quick. Patient demonstrated increased endurance and motor unit recruitment compared to   Patient demonstrated increased endurance  Patient demonstrates Patient demonstrated understanding of today's instruction, education, and recommendations. Patient is progressing appropriately towards goals. Patient will continue to benefit from skilled PT services to modify and progress therapeutic interventions, analyze and address functional mobility deficits, analyze and address ROM deficits, analyze and address strength deficits, analyze and address soft tissue restrictions, analyze and cue for proper movement patterns, and analyze and modify for postural abnormalities to address functional deficits and attain remaining goals.     Progress toward goals / Updated goals:  []  See Progress Note/Recertification    Short term goals:   Patient will demonstrate proper dietary/fluid habits and urge suppression strategies that promote

## 2023-04-17 ENCOUNTER — APPOINTMENT (OUTPATIENT)
Dept: PHYSICAL THERAPY | Age: 53
End: 2023-04-17

## 2023-04-19 ENCOUNTER — HOSPITAL ENCOUNTER (OUTPATIENT)
Facility: HOSPITAL | Age: 53
Setting detail: RECURRING SERIES
Discharge: HOME | End: 2023-04-22
Payer: COMMERCIAL

## 2023-04-19 PROCEDURE — 97110 THERAPEUTIC EXERCISES: CPT

## 2023-04-19 PROCEDURE — 97530 THERAPEUTIC ACTIVITIES: CPT

## 2023-04-19 PROCEDURE — 97112 NEUROMUSCULAR REEDUCATION: CPT

## 2023-04-19 NOTE — PROGRESS NOTES
[facility]  [address], [address]  Ph [phone]  Fx [phone]    Physical Therapy Progress Note  Patient name: Jerry Burnett Start of Care: 2023   Referral source: Panfilo Martinez MD : 1970   Medical/Treatment Diagnosis: Urge incontinence [N39.41] Onset Date:2021   Prior Hospitalization: see medical history Provider#: [id number]   Medications: Verified on Patient Summary List     Comorbidities: none  Prior Level of Function:patient was not incontinent     Visits from Start of Care: 10                                      Missed Visits: 2    Updated Goals/Measure of Progress: To be achieved in 2 treatments:  Short term goals:   Patient will demonstrate proper dietary/fluid habits and urge suppression strategies that promote bladder and bowel health to aid in management of urinary urgency and incontinence. Status at eval: Patient consuming 64-80 oz of water and unaware of bladder fitness, dietary irritants and urge suppression strategies. 2023: pt has tried using urge suppression techniques but reports it's very difficult   Current: pt has been trying to delay the urge to go to the bathroom. She tries to only go when she really has to, goes every hour, sometimes 2 hours 3/20/2023 progressing pt has been able to delay urge to go by about 2 hours 2023 progressing  Current:  Patient reports every 1/2 hour during her period otherwise about every 2 hours. No change. 2023     Patient will report improvement in UI complaints evidence by a decrease in pad usage and/or amount of leakage by 25-50% to improve QOL. Status at eval: Patient using 3 pads (3) per day, generally soaked (amount of leakage).    Last PN: pt is using 2 pads per day, sometimes they are soaked, last week only 1 time was soaked 2023 progressing  Current: pt sometimes uses 3 pads per day, has been experiencing seasonal allergies and has been coughing more frequently 3/20/2023
Luis Antonio Gordillo [facility]   5/3/2023 11:50 AM Juan C Montemayor PT [facility]

## 2023-04-24 ENCOUNTER — HOSPITAL ENCOUNTER (OUTPATIENT)
Facility: HOSPITAL | Age: 53
Setting detail: RECURRING SERIES
Discharge: HOME | End: 2023-04-27
Payer: COMMERCIAL

## 2023-04-24 PROCEDURE — 97112 NEUROMUSCULAR REEDUCATION: CPT

## 2023-04-24 PROCEDURE — 97530 THERAPEUTIC ACTIVITIES: CPT

## 2023-04-24 PROCEDURE — 97110 THERAPEUTIC EXERCISES: CPT

## 2023-04-24 NOTE — PROGRESS NOTES
PHYSICAL / OCCUPATIONAL THERAPY - DAILY TREATMENT NOTE (updated )    Patient Name: Viri Hammer    Date: 2023    : 1970  Insurance: Payor: Van Estevez / Plan: OPTIMA HMO / Product Type: *No Product type* /      Patient  verified Yes     Visit #   Current / Total 11 12   Time   In / Out 1235 110   Pain   In / Out 0 0   Subjective Functional Status/Changes: Patient reports decreased leakage and pad usage. Changes to:  Meds, Allergies, Med Hx, Sx Hx? If yes, update Summary List no       TREATMENT AREA =  Urge incontinence [N39.41]    OBJECTIVE          Therapeutic Procedures: Tx Min Billable or 1:1 Min (if diff from Tx Min) Procedure, Rationale, Specifics   19  48762 Therapeutic Exercise (timed):  increase ROM, strength, coordination, balance, and proprioception to improve patient's ability to progress to PLOF and address remaining functional goals. (see flow sheet as applicable)     Details if applicable:       8  15770 Neuromuscular Re-Education (timed):  improve balance, coordination, kinesthetic sense, posture, core stability and proprioception to improve patient's ability to develop conscious control of individual muscles and awareness of position of extremities in order to progress to PLOF and address remaining functional goals. (see flow sheet as applicable)     Details if applicable:     8  05881 Therapeutic Activity (timed):  use of dynamic activities replicating functional movements to increase ROM, strength, coordination, balance, and proprioception in order to improve patient's ability to progress to PLOF and address remaining functional goals.   (see flow sheet as applicable)     Details if applicable:            Details if applicable:            Details if applicable:     28  [facility] Totals Reminder: bill using total billable min of TIMED therapeutic procedures (example: do not include dry needle or estim unattended, both untimed codes, in totals to left)  8-22 min = 1 unit; 23-37 min = 2

## 2023-05-03 ENCOUNTER — HOSPITAL ENCOUNTER (OUTPATIENT)
Facility: HOSPITAL | Age: 53
Setting detail: RECURRING SERIES
Discharge: HOME | End: 2023-05-06
Payer: COMMERCIAL

## 2023-05-03 PROCEDURE — 97530 THERAPEUTIC ACTIVITIES: CPT

## 2023-05-03 PROCEDURE — 97110 THERAPEUTIC EXERCISES: CPT

## 2023-05-03 PROCEDURE — 97112 NEUROMUSCULAR REEDUCATION: CPT

## 2023-05-03 NOTE — PROGRESS NOTES
Physical Therapy Discharge Instructions    [facility]  [address] [place], [address]  Ph [phone]  Fx [phone]      Patient: Nneka Clay  : 1970      Continue Home Exercise Program 1 times per day for 4 weeks, then decrease to 3 times per week.   Perform the pelvic floor contraction sets 3x/day      Follow up with MD:     [] Upon completion of therapy     [x] As needed      Recommendations:     [x]   Return to activity with home program    []   Return to activity with the following modifications:       []Post Rehab Program    []Join Independent aquatic program     []Return to/join local gym                Colette Montes, PT 5/3/2023 12:18 PM

## 2023-05-03 NOTE — PROGRESS NOTES
[facility]  [address], [address]  Ph [phone]  Fx [phone]    Physical Therapy Discharge Summary    Patient name: Yousif Ireland Start of Care: 2023   Referral source: Vinay Abdi MD : 1970   Medical/Treatment Diagnosis: Urge incontinence [N39.41] Onset Date:2021   Prior Hospitalization: see medical history Provider#: [id number]   Medications: Verified on Patient Summary List     Comorbidities: none  Prior Level of Function:patient was not incontinent      Visits from Start of Care: 12    Missed Visits: 2    Reporting Period : 2023 to 5/3/2023    Goals/Measure of Progress:  Short term goals:   Patient will demonstrate proper dietary/fluid habits and urge suppression strategies that promote bladder and bowel health to aid in management of urinary urgency and incontinence. Status at eval: Patient consuming 64-80 oz of water and unaware of bladder fitness, dietary irritants and urge suppression strategies. 2023: pt has tried using urge suppression techniques but reports it's very difficult   Current: pt has been trying to delay the urge to go to the bathroom. She tries to only go when she really has to, goes every hour, sometimes 2 hours 3/20/2023 progressing pt has been able to delay urge to go by about 2 hours 2023 progressing  Current:  Patient reports every 1/2 hour during her period otherwise about every 2 hours. No change. 2023  Current:  Patient reports voiding about every 2 hours except during menstruation D/C GOAL  5/3/2023     Patient will report improvement in UI complaints evidence by a decrease in pad usage and/or amount of leakage by 25-50% to improve QOL. Status at eval: Patient using 3 pads (3) per day, generally soaked (amount of leakage).    Last PN: pt is using 2 pads per day, sometimes they are soaked, last week only 1 time was soaked 2023 progressing  Current: pt sometimes uses 3 pads per day, has been

## 2023-05-03 NOTE — PROGRESS NOTES
PHYSICAL / OCCUPATIONAL THERAPY - DAILY TREATMENT NOTE (updated )    Patient Name: Selma Duran    Date: 5/3/2023    : 1970  Insurance: Payor: Jeb Quintero / Plan: OPTIMA HMO / Product Type: *No Product type* /      Patient  verified Yes     Visit #   Current / Total 12 12   Time   In / Out 1150 1230   Pain   In / Out 0 0   Subjective Functional Status/Changes: Patient reports feeling comfortable with discharging today. Changes to:  Meds, Allergies, Med Hx, Sx Hx? If yes, update Summary List no       TREATMENT AREA =  Urge incontinence [N39.41]    OBJECTIVE          Therapeutic Procedures: Tx Min Billable or 1:1 Min (if diff from Tx Min) Procedure, Rationale, Specifics   8  13276 Therapeutic Exercise (timed):  increase ROM, strength, coordination, balance, and proprioception to improve patient's ability to progress to PLOF and address remaining functional goals. (see flow sheet as applicable)     Details if applicable:       8  57633 Neuromuscular Re-Education (timed):  improve balance, coordination, kinesthetic sense, posture, core stability and proprioception to improve patient's ability to develop conscious control of individual muscles and awareness of position of extremities in order to progress to PLOF and address remaining functional goals. (see flow sheet as applicable)     Details if applicable:     24  92869 Therapeutic Activity (timed):  use of dynamic activities replicating functional movements to increase ROM, strength, coordination, balance, and proprioception in order to improve patient's ability to progress to PLOF and address remaining functional goals.   (see flow sheet as applicable)     Details if applicable:            Details if applicable:            Details if applicable:     36  [facility] Totals Reminder: bill using total billable min of TIMED therapeutic procedures (example: do not include dry needle or estim unattended, both untimed codes, in totals to left)  8-22 min = 1 unit;